# Patient Record
Sex: MALE | Race: WHITE | NOT HISPANIC OR LATINO | URBAN - METROPOLITAN AREA
[De-identification: names, ages, dates, MRNs, and addresses within clinical notes are randomized per-mention and may not be internally consistent; named-entity substitution may affect disease eponyms.]

---

## 2017-04-19 ENCOUNTER — EMERGENCY (EMERGENCY)
Facility: HOSPITAL | Age: 27
LOS: 1 days | Discharge: HOME | End: 2017-04-19
Attending: EMERGENCY MEDICINE | Admitting: EMERGENCY MEDICINE
Payer: MEDICAID

## 2017-04-19 VITALS
SYSTOLIC BLOOD PRESSURE: 120 MMHG | TEMPERATURE: 98 F | RESPIRATION RATE: 18 BRPM | HEART RATE: 74 BPM | DIASTOLIC BLOOD PRESSURE: 70 MMHG | OXYGEN SATURATION: 100 %

## 2017-04-19 DIAGNOSIS — M54.5 LOW BACK PAIN: ICD-10-CM

## 2017-04-19 PROCEDURE — 99284 EMERGENCY DEPT VISIT MOD MDM: CPT

## 2017-04-19 PROCEDURE — 72131 CT LUMBAR SPINE W/O DYE: CPT | Mod: 26

## 2017-04-19 RX ORDER — METHOCARBAMOL 500 MG/1
1000 TABLET, FILM COATED ORAL ONCE
Qty: 0 | Refills: 0 | Status: COMPLETED | OUTPATIENT
Start: 2017-04-19 | End: 2017-04-19

## 2017-04-19 RX ORDER — IBUPROFEN 200 MG
1 TABLET ORAL
Qty: 20 | Refills: 0 | OUTPATIENT
Start: 2017-04-19 | End: 2017-04-24

## 2017-04-19 RX ORDER — IBUPROFEN 200 MG
600 TABLET ORAL ONCE
Qty: 0 | Refills: 0 | Status: COMPLETED | OUTPATIENT
Start: 2017-04-19 | End: 2017-04-19

## 2017-04-19 RX ORDER — METHOCARBAMOL 500 MG/1
1 TABLET, FILM COATED ORAL
Qty: 15 | Refills: 0 | OUTPATIENT
Start: 2017-04-19 | End: 2017-04-24

## 2017-04-19 RX ADMIN — METHOCARBAMOL 1000 MILLIGRAM(S): 500 TABLET, FILM COATED ORAL at 18:20

## 2017-04-19 RX ADMIN — Medication 600 MILLIGRAM(S): at 18:20

## 2017-04-19 NOTE — ED ADULT TRIAGE NOTE - CHIEF COMPLAINT QUOTE
Pt states for the last few days hes had lower back pain. States he was lifting heavy items but denies any trauma.

## 2017-04-19 NOTE — ED PROVIDER NOTE - NS ED MD SCRIBE ATTENDING SCRIBE SECTIONS
INTAKE ASSESSMENT/SCREENINGS/HIV/PHYSICAL EXAM/OBSERVATION MONITORING PLAN/RESULTS/DISPOSITION/VITAL SIGNS( Pullset)/REVIEW OF SYSTEMS/PAST MEDICAL/SURGICAL/SOCIAL HISTORY/HISTORY OF PRESENT ILLNESS

## 2017-04-19 NOTE — ED PROVIDER NOTE - OBJECTIVE STATEMENT
26 year old male a PMHx of depression on Wellbutrin and Abilify presents with lower back pain x few days, worsening today. 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 Patient states he did go through a lot of bending motions during the incident. Denies trauma, lower extremity weakness, numbness, tingling, abdominal pain, nausea, vomiting, bladder or bowel dysfunction or saddle anesthesia. Took aspirin for pain, last dose PTA.

## 2017-04-19 NOTE — ED PROVIDER NOTE - PROGRESS NOTE DETAILS
CT findings discussed w pt, importance of f/u w spine specialist and pain management. no neuro deficits

## 2017-08-18 NOTE — ED PROVIDER NOTE - CONDITION AT DISCHARGE:
Patient was recently put on Dulaglutide (TRULICITY) 1.5 GL/1.9IV SOPN [[id number]] and is have uncomfortable swelling in elbows and ankles. Said 1.5x normals size ankles.  Patient works all day up on his feet and wants to know if he could maybe get a script for a diuretic called in to the [facility] in [facility] - Patient has stopped taking trulicity   Please advise [phone] Improved

## 2022-04-11 ENCOUNTER — EMERGENCY (EMERGENCY)
Facility: HOSPITAL | Age: 32
LOS: 1 days | Discharge: HOME | End: 2022-04-11
Attending: EMERGENCY MEDICINE | Admitting: EMERGENCY MEDICINE
Payer: MEDICAID

## 2022-04-11 VITALS
SYSTOLIC BLOOD PRESSURE: 116 MMHG | RESPIRATION RATE: 19 BRPM | HEIGHT: 74 IN | OXYGEN SATURATION: 97 % | HEART RATE: 93 BPM | TEMPERATURE: 98 F | WEIGHT: 240.08 LBS | DIASTOLIC BLOOD PRESSURE: 77 MMHG
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
## 2022-04-11 DIAGNOSIS — M79.671 PAIN IN RIGHT FOOT: ICD-10-CM
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
## 2022-04-11 DIAGNOSIS — S90.822A BLISTER (NONTHERMAL), LEFT FOOT, INITIAL ENCOUNTER: ICD-10-CM

## 2022-04-11 DIAGNOSIS — S90.821A BLISTER (NONTHERMAL), RIGHT FOOT, INITIAL ENCOUNTER: ICD-10-CM

## 2022-04-11 PROCEDURE — 99284 EMERGENCY DEPT VISIT MOD MDM: CPT

## 2022-04-11 PROCEDURE — 73630 X-RAY EXAM OF FOOT: CPT | Mod: 26,LT,RT

## 2022-04-11 RX ORDER — IBUPROFEN 200 MG
600 TABLET ORAL ONCE
Refills: 0 | Status: COMPLETED | OUTPATIENT
Start: 2022-04-11 | End: 2022-04-11

## 2022-04-11 RX ADMIN — Medication 600 MILLIGRAM(S): at 22:00

## 2022-04-11 RX ADMIN — Medication 600 MILLIGRAM(S): at 21:02

## 2022-04-11 NOTE — ED PROVIDER NOTE - OBJECTIVE STATEMENT
30 y/o M with psych history presents with bilateral foot pain for several days after walking home approximately x1 mile barefoot. 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 He is concerned there may be glass in his feet. No trauma to feet, no fevers, no chills.

## 2022-04-11 NOTE — ED PROVIDER NOTE - NSFOLLOWUPINSTRUCTIONS_ED_ALL_ED_FT
Blisters, Adult       A blister is a raised bubble of skin filled with liquid. Blisters often develop on skin that rubs or presses against another surface repeatedly (friction blister). Friction blisters can occur on any part of the body, but they usually form on the hands or the feet. Long-term pressure on that same area of skin can also cause the area of skin to become hardened. This hardened skin is called a callus.      What are the causes?    Besides friction, blisters can be caused by:  •An injury, such as a burn.      •An allergic reaction.      •An infection.      •Exposure to irritating chemicals.      Friction blisters often occur in areas with a lot of heat and moisture. Friction blisters often result from:  •Sports.      •Repetitive activities.      •Using tools and doing other activities without wearing gloves.      •Shoes that are too tight or too loose.        What are the signs or symptoms?    A blister is often round and looks like a bump. It may hurt or feel itchy. Before a blister forms, your skin may:  •Turn red.      •Feel warm.      •Itch.      •Be painful to the touch.        How is this diagnosed?    A blister is diagnosed with a physical exam.      How is this treated?    Treatment usually involves protecting the area where the blister has formed until your skin has healed. Treatments may include:  •Using a bandage (dressing) to cover your blister.      •Putting extra padding around and over your blister so that the blister does not rub on anything.      •Applying antibiotic ointment.      Most blisters break open, dry up, and go away on their own within 1–2 weeks. Blisters that are very painful may be drained before they break open on their own. If your blister is large or painful, your health care provider can drain it.      Follow these instructions at home:    Medicines     •Take or apply over-the-counter and prescription medicines only as told by your health care provider.      •If you were prescribed an antibiotic medicine, take or apply it as told by your health care provider. Do not stop using the antibiotic even if you start to feel better.      Skin care     • Do not pop your blister. This can cause infection.      •Keep your blister clean and dry. This helps to prevent infection.      •Before you swim or use a hot tub, cover your blister with a waterproof dressing.      •Protect the area where your blister has formed as told by your health care provider.    •Follow instructions from your health care provider about how to take care of your blister. Make sure you:  •Wash your hands with soap and water for at least 20 seconds before and after you change your dressing. If soap and water are not available, use hand .      •Change your dressing as told by your health care provider.        Infection signs     Check your blister every day for signs of infection. Check for:  •More redness, swelling, or pain.      •More fluid or blood.      •Warmth.      •Pus or a bad smell.      General instructions    •If you have a blister on a foot or toe, wear different shoes until your blister heals.      •Avoid the activity that caused the blister until your blister heals.      •Keep all follow-up visits as told by your health care provider. This is important.        How is this prevented?    Taking these steps can help to prevent blisters that are caused by friction. Make sure you:  •Wear comfortable shoes that fit well.      •Always wear socks with shoes.      •Wear extra socks or use tape, dressings, or pads over blister-prone areas as needed. You may also apply petroleum jelly under dressings in blister-prone areas.      •Wear protective gear, such as gloves, when taking part in sports or activities that can cause blisters.      •Wear loose-fitting, moisture-wicking clothes when taking part in sports or activities.      •Use powders as needed to keep your feet dry.        Contact a health care provider if:    •You have more redness, swelling, or pain around your blister.      •You have more fluid or blood coming from your blister.      •Your blister feels warm to the touch.      •You have pus or a bad smell coming from your blister.      •You have a fever or chills.      •Your blister gets better and then it gets worse.        Summary    •A blister is a raised bubble of skin filled with liquid. Blisters often develop on skin that rubs or presses against another surface repeatedly (friction blister).      •Most blisters break open, dry up, and go away on their own within 1–2 weeks.      •Keep your blister clean and dry. This helps to prevent infection.      •Take steps to help prevent blisters that are caused by friction.      •Contact a health care provider if you have signs of infection.      This information is not intended to replace advice given to you by your health care provider. Make sure you discuss any questions you have with your health care provider.

## 2022-04-11 NOTE — ED PROVIDER NOTE - MUSCULOSKELETAL, MLM
+Plantar surface of bilateral feet with a few blisters. No open wounds, no palpable glass, and mild TTP around blisters.

## 2022-04-11 NOTE — ED ADULT NURSE NOTE - OBJECTIVE STATEMENT
Pt presents to ED c/o b/l feet pain s/p stepping on glass yesterday. Denies any bleeding at this time. Denies f/c/n/v/d, cp, sob, cough. Pt aox4, MAEx4, spont unlabored breathing on ra.

## 2022-04-11 NOTE — ED ADULT TRIAGE NOTE - CHIEF COMPLAINT QUOTE
Pt c/o bilateral foot pain. Yesterday pt noted shattered pieces of glass on his feet, but denies bleeding/abrasions/lacerations. Pt doesn't recall stepping on any glass/trauma/falls to his feet.  Reports difficulty walking due to pain. Pmh of asthma.

## 2022-04-11 NOTE — ED PROVIDER NOTE - PATIENT PORTAL LINK FT
You can access the FollowMyHealth Patient Portal offered by [facility] by registering at the following website: [email or web address]. By joining [employer]’s FollowMyHealth portal, you will also be able to view your health information using other applications (apps) compatible with our system.

## 2022-04-11 NOTE — ED PROVIDER NOTE - CLINICAL SUMMARY MEDICAL DECISION MAKING FREE TEXT BOX
32 y/o M here with bilateral foot pain after walking barefoot concerned about glass in his feet. On exam pt has numerous blisters to the soles of his feet which are likely the source of his pain. X-ray negative for glass in soft tissue. Patient reassured. Stable for discharge.

## 2024-02-02 NOTE — ED PROVIDER NOTE - CONSTITUTIONAL, MLM
actual normal... Well appearing, well nourished, awake, alert, oriented to person, place, time/situation and in no apparent distress.